# Patient Record
Sex: MALE | Race: WHITE | Employment: OTHER | URBAN - METROPOLITAN AREA
[De-identification: names, ages, dates, MRNs, and addresses within clinical notes are randomized per-mention and may not be internally consistent; named-entity substitution may affect disease eponyms.]

---

## 2017-04-26 RX ORDER — METOPROLOL SUCCINATE 50 MG/1
TABLET, EXTENDED RELEASE ORAL
Qty: 90 TAB | Refills: 3 | Status: SHIPPED | OUTPATIENT
Start: 2017-04-26 | End: 2018-05-07 | Stop reason: SDUPTHER

## 2017-10-23 ENCOUNTER — OFFICE VISIT (OUTPATIENT)
Dept: CARDIOLOGY CLINIC | Age: 72
End: 2017-10-23

## 2017-10-23 VITALS
SYSTOLIC BLOOD PRESSURE: 170 MMHG | HEART RATE: 56 BPM | DIASTOLIC BLOOD PRESSURE: 95 MMHG | HEIGHT: 70 IN | OXYGEN SATURATION: 98 % | WEIGHT: 161 LBS | BODY MASS INDEX: 23.05 KG/M2

## 2017-10-23 DIAGNOSIS — I10 ESSENTIAL HYPERTENSION: ICD-10-CM

## 2017-10-23 DIAGNOSIS — I44.7 LBBB (LEFT BUNDLE BRANCH BLOCK): Primary | ICD-10-CM

## 2017-10-23 NOTE — PROGRESS NOTES
1. Have you been to the ER, urgent care clinic since your last visit? Hospitalized since your last visit?no    2. Have you seen or consulted any other health care providers outside of the [address] [place] [address] since your last visit? Include any pap smears or colon screening.  no

## 2017-10-23 NOTE — MR AVS SNAPSHOT
Visit Information Date & Time Provider Department Dept. Phone Encounter #  
 10/23/2017  8:00 AM Brittney Bedoya DO [facility] [id number] Upcoming Health Maintenance Date Due DTaP/Tdap/Td series (1 - Tdap) 8/14/1964 FOBT Q 1 YEAR AGE 50-75 8/14/1993 ZOSTER VACCINE AGE 60> 6/14/2003 GLAUCOMA SCREENING Q2Y 8/14/2008 Pneumococcal 65+ Low/Medium Risk (1 of 2 - PCV13) 8/14/2008 MEDICARE YEARLY EXAM 8/14/2008 INFLUENZA AGE 9 TO ADULT 8/1/2017 Allergies as of 10/23/2017  Review Complete On: 10/23/2017 By: Brittney Bedoya DO No Known Allergies Current Immunizations  Never Reviewed No immunizations on file. Not reviewed this visit You Were Diagnosed With   
  
 Codes Comments LBBB (left bundle branch block)    -  Primary ICD-10-CM: I44.7 ICD-9-CM: 426.3 Essential hypertension     ICD-10-CM: I10 
ICD-9-CM: 401.9 Vitals BP Pulse Height(growth percentile) Weight(growth percentile) SpO2 BMI  
 (!) 170/95 (BP 1 Location: Left arm, BP Patient Position: Sitting) (!) 56 5' 10\" (1.778 m) 161 lb (73 kg) 98% 23.1 kg/m2 Smoking Status Former Smoker Vitals History BMI and BSA Data Body Mass Index Body Surface Area  
 23.1 kg/m 2 1.9 m 2 Preferred Pharmacy Pharmacy Name Phone [facility] [address] [place] [address] [phone] Your Updated Medication List  
  
   
This list is accurate as of: 10/23/17  8:36 AM.  Always use your most recent med list.  
  
  
  
  
 aspirin delayed-release 81 mg tablet Take 81 mg by mouth daily. calcium carbonate 200 mg calcium (500 mg) Chew Commonly known as:  TUMS Take 1 tablet by mouth as needed. FISH OIL 1,000 mg Cap Generic drug:  omega-3 fatty acids-vitamin e Take 1 Cap by mouth daily. metoprolol succinate 50 mg XL tablet Commonly known as:  TOPROL-XL  
TAKE 1 TABLET BY MOUTH EVERY DAY  
  
 nitroglycerin 0.4 mg SL tablet Commonly known as:  NITROSTAT  
1 Tab by SubLINGual route every five (5) minutes as needed for Chest Pain. We Performed the Following AMB POC EKG ROUTINE W/ 12 LEADS, INTER & REP [10580 CPT(R)] Introducing [facility] SERVICES! [employer] introduces Topic patient portal. Now you can access parts of your medical record, email your doctor's office, and request medication refills online. 1. In your internet browser, go to [email or web address] 2. Click on the First Time User? Click Here link in the Sign In box. You will see the New Member Sign Up page. 3. Enter your Topic Access Code exactly as it appears below. You will not need to use this code after youve completed the sign-up process. If you do not sign up before the expiration date, you must request a new code. · Topic Access Code: [email or web address] Expires: 1/21/2018  7:44 AM 
 
4. Enter the last four digits of your Social Security Number (xxxx) and Date of Birth (mm/dd/yyyy) as indicated and click Submit. You will be taken to the next sign-up page. 5. Create a Topic ID. This will be your Topic login ID and cannot be changed, so think of one that is secure and easy to remember. 6. Create a Topic password. You can change your password at any time. 7. Enter your Password Reset Question and Answer. This can be used at a later time if you forget your password. 8. Enter your e-mail address. You will receive e-mail notification when new information is available in [address] 9. Click Sign Up. You can now view and download portions of your medical record. 10. Click the Download Summary menu link to download a portable copy of your medical information. If you have questions, please visit the Frequently Asked Questions section of the Topic website.  Remember, Topic is NOT to be used for urgent needs. For medical emergencies, dial 911. Now available from your iPhone and Android! Please provide this summary of care documentation to your next provider. Your primary care clinician is listed as Mary Wharton. If you have any questions after today's visit, please call [phone].

## 2017-10-23 NOTE — PROGRESS NOTES
HPI: I saw Dannie Mulligan III in my office today a cardiovascular evaluation regarding his conduction system disease. Mr. Yamile Vaughan is a pleasant 75-year-old white male with history of a chronic left bundle branch block and some atypical anginal type chest discomforts over the years which actually have been more suggestive of gastroesophageal reflux disease than angina.     He does have history of having a nuclear myocardial perfusion study in December 2013 which showed no evidence of prior infarction or ischemia and an overall ejection fraction of 48% with some septal and apical dyskinesia likely related to his left bundle branch block.     He comes to the office today and relates that he is really doing quite well. He just got back from a three-week trip to [place] and did a lot of walking over there and had absolutely no problems with chest pain, shortness of breath, or fatigue issues. Encounter Diagnoses   Name Primary?  LBBB (left bundle branch block) Yes    Essential hypertension with suboptimal control        Discussion: This gentleman appears to be doing about as well as we could expect clinically without any cardiovascular symptomatology. However, his blood pressure was elevated today and although he did not take his Toprol this morning I suspect that his blood pressure is high enough that it may warrant another blood pressure medication. I have asked him to check his blood pressure 2 or 3 times a week for the next couple of weeks after he takes his morning Toprol and if his blood pressure is remaining primarily above 541 systolic he should follow-up with Dr. Margaret Leon for adjustment of his blood pressure medications. I would suspect that he might benefit from a thiazide diuretic or amlodipine, but course I would leave decision on that up to Dr. Margaret Leon.     He does have a chronic left bundle branch block and we did discuss the fact that if he should start developing higher degree AV block associated with the right bundle branch he could have dizziness episodes or profound fatigue and if he develops any of these symptoms he will let us know and I would get a Holter monitor study on him. Historically, he has not had only very atypical anginal type chest discomforts in the past and has had none recently and he is never had significant elevation of his cholesterol. Since clinically he otherwise seems to be doing very well, I am simply going to plan to see him again in a year or as needed if any new cardiovascular symptoms should surface in the interim. PCP: Avril Gaspar MD      Past Medical History:   Diagnosis Date    BPH (benign prostatic hyperplasia)     minimal    Cardiac nuclear imaging test 12/23/2013    No ischemia or prior infarction. EF 48% (possibly underestimated). Septal & apical dysk likely from LBBB. Nondiagnostic EKG on pharm stress test due to LBBB.  Family history of prostate cancer     LBBB (left bundle branch block)     Screening for prostate cancer        Past Surgical History:   Procedure Laterality Date    HX HERNIA REPAIR  5/1970    HX TONSILLECTOMY         Current Outpatient Rx   Name  Route  Sig  Dispense  Refill    metoprolol succinate (TOPROL-XL) 50 mg XL tablet        TAKE 1 TABLET BY MOUTH EVERY DAY    90 tablet    3      calcium carbonate (TUMS) 200 mg calcium (500 mg) chew    Oral    Take 1 tablet by mouth as needed.  aspirin delayed-release 81 mg tablet    Oral    Take 81 mg by mouth daily.  omega-3 fatty acids-vitamin e (FISH OIL) 1,000 mg cap    Oral    Take 1 Cap by mouth daily.  nitroglycerin (NITROSTAT) 0.4 mg SL tablet    SubLINGual    1 Tab by SubLINGual route every five (5) minutes as needed for Chest Pain.     25 Tab    3         No Known Allergies    Social History :  Social History   Substance Use Topics    Smoking status: Former Smoker     Packs/day: 1.00     Years: 5.00     Quit date: 1/1/1973    Smokeless tobacco: Never Used    Alcohol use Yes      Comment: socially        Family History: family history includes Cancer in an other family member; Heart defect in his father. Review of Systems:    Constitutional: Negative. Respiratory: Negative. Cardiovascular: Negative. Gastrointestinal: Positive for heartburn. Negative for abdominal pain, blood in stool, constipation, diarrhea, melena, nausea and vomiting. Musculoskeletal: Negative. Physical Exam:    The patient is a cooperative, alert, well developed, well nourished 74 y.o.  male who is in no acute distress at the time of the examination. Visit Vitals    BP (!) 170/95 (BP 1 Location: Left arm, BP Patient Position: Sitting)    Pulse (!) 56    Ht 5' 10\" (1.778 m)    Wt 73 kg (161 lb)    SpO2 98%    BMI 23.1 kg/m2       HEENT: Conjuctiva white, mucosa moist, no pallor or cyanosis. NECK: Supple without masses, tenderness or thyromegaly. There was no jugular venous distention. Carotid are full bilaterally without bruits. CHEST: Symmetrical with good excursion. LUNGS: Clear to auscultation in all fields. HEART: The apex is not displaced. There were no lifts, thrills or heaves. There is a normal S1 and S2 without appreciable murmurs, rubs, clicks, or gallops auscultated. ABDOMEN: Soft without masses, tenderness or organomegaly. EXTREMITIES: Full peripheral pulses without peripheral edema. Review of Data: See PMH and Cardiology and Imaging sections for cardiac testing    Lab Results   Component Value Date/Time    Cholesterol, total 135 12/18/2013 12:00 AM    HDL Cholesterol 40 12/18/2013 12:00 AM    LDL, calculated 75 12/18/2013 12:00 AM    VLDL, calculated 20 12/18/2013 12:00 AM    Triglyceride 101 12/18/2013 12:00 AM       Results for orders placed or performed in visit on 10/23/17   AMB POC EKG ROUTINE W/ 12 LEADS, INTER & REP     Status: None    Narrative    Sinus bradycardia rate 56.   Left axis deviation. Complete left bundle branch block with some secondary ST-T changes. This EKG is similar to the EKG of October 24, 2016. Brittney Bedoya D.O., F.A.C.C. [facility]  [address] [place] [address] and [facility] [place]  [address] [place] [address]. [address]    PLEASE NOTE:  This document has been produced using voice recognition software. Unrecognized errors in transcription may be present.

## 2017-10-23 NOTE — PROGRESS NOTES
Review of Systems   Constitutional: Negative. Respiratory: Negative. Cardiovascular: Negative. Gastrointestinal: Positive for heartburn. Negative for abdominal pain, blood in stool, constipation, diarrhea, melena, nausea and vomiting. Musculoskeletal: Negative.

## 2018-05-07 RX ORDER — METOPROLOL SUCCINATE 50 MG/1
TABLET, EXTENDED RELEASE ORAL
Qty: 90 TAB | Refills: 3 | Status: SHIPPED | OUTPATIENT
Start: 2018-05-07 | End: 2019-05-01 | Stop reason: SDUPTHER

## 2018-10-29 ENCOUNTER — OFFICE VISIT (OUTPATIENT)
Dept: CARDIOLOGY CLINIC | Age: 73
End: 2018-10-29

## 2018-10-29 VITALS
WEIGHT: 158 LBS | OXYGEN SATURATION: 98 % | DIASTOLIC BLOOD PRESSURE: 84 MMHG | BODY MASS INDEX: 22.62 KG/M2 | HEART RATE: 54 BPM | SYSTOLIC BLOOD PRESSURE: 142 MMHG | HEIGHT: 70 IN

## 2018-10-29 DIAGNOSIS — I10 ESSENTIAL HYPERTENSION: ICD-10-CM

## 2018-10-29 DIAGNOSIS — I44.7 LBBB (LEFT BUNDLE BRANCH BLOCK): Primary | ICD-10-CM

## 2018-10-29 RX ORDER — RAMIPRIL 10 MG/1
CAPSULE ORAL
Refills: 0 | COMMUNITY
Start: 2018-07-30

## 2018-10-29 NOTE — PROGRESS NOTES
[facility] for Chief Complaint Patient presents with  Abnormal EKG  
  1 year follow up [facility] preferred language for health care discussion is english/other. Is someone accompanying this pt? no 
 
Is the patient using any DME equipment during OV? no 
 
Depression Screening: PHQ over the last two weeks 10/29/2018 Little interest or pleasure in doing things Not at all Feeling down, depressed, irritable, or hopeless Not at all Total Score PHQ 2 0 Learning Assessment: 
Learning Assessment 10/29/2018 PRIMARY LEARNER Patient HIGHEST LEVEL OF EDUCATION - PRIMARY LEARNER  GRADUATED HIGH SCHOOL OR GED  
BARRIERS PRIMARY LEARNER NONE  
CO-LEARNER CAREGIVER No  
PRIMARY LANGUAGE ENGLISH  
LEARNER PREFERENCE PRIMARY READING  
  -  
  -  
ANSWERED BY Patient RELATIONSHIP SELF Abuse Screening: 
Abuse Screening Questionnaire 10/29/2018 Do you ever feel afraid of your partner? Rella Picket Are you in a relationship with someone who physically or mentally threatens you? Rella Picket Is it safe for you to go home? Srini Bloom Fall Risk Fall Risk Assessment, last 12 mths 10/29/2018 Able to walk? Yes Fall in past 12 months? No  
 
 
Pt currently taking Anticoagulant therapy? no 
 
Coordination of Care: 1. Have you been to the ER, urgent care clinic since your last visit? Hospitalized since your last visit? no 
 
2. Have you seen or consulted any other health care providers outside of the [address] [place] [address] since your last visit? Include any pap smears or colon screening. no

## 2018-10-29 NOTE — PROGRESS NOTES
HPI:  I saw Deb Granado III in my office today a cardiovascular evaluation regarding his conduction system disease. Mr. Michael Macedo is a pleasant 68-year-old white male with history of a chronic left bundle branch block and some atypical anginal type chest discomforts over the years which actually have been more suggestive of gastroesophageal reflux disease than angina. 
  
He does have history of having a nuclear myocardial perfusion study in December 2013 which showed no evidence of prior infarction or ischemia and an overall ejection fraction of 48% with some septal and apical dyskinesia likely related to his left bundle branch block. He comes in today and relates that he is doing reasonably well. He is staying fairly active and denies any cardiovascular symptomatology at this time. Encounter Diagnoses Name Primary?  LBBB (left bundle branch block) Yes  Essential hypertension Discussion: This gentleman continues to do quite well and I have no recommendations for change at this time. He is not having any problems with chest pain, shortness of breath, or fatigue or any other cardiovascular complaints to suggest any problems with the development of obstructive coronary artery disease. He does not have history of hypercholesterolemia but I would like to get a copy of his latest lipid profile from his family physician. His blood pressure is borderline elevated today at 142/84 but he was my first patient and he just taken his medications within an hour or so of his visit I am not going to make any changes and would simply have him follow-up with his family physician in that regard. He does have a left bundle branch block but is not had any problems with dizziness or lightheadedness issues and since he is otherwise doing well I will simply plan to see him again in a year. PCP: Divya Nieto MD 
 
 
Past Medical History:  
Diagnosis Date  BPH (benign prostatic hyperplasia)   
 minimal  
 Cardiac nuclear imaging test 2013 No ischemia or prior infarction. EF 48% (possibly underestimated). Septal & apical dysk likely from LBBB. Nondiagnostic EKG on pharm stress test due to LBBB.  Family history of prostate cancer  LBBB (left bundle branch block)  Screening for prostate cancer Past Surgical History:  
Procedure Laterality Date  HX HERNIA REPAIR  1970  HX TONSILLECTOMY Current Outpatient Medications Medication Sig  
 ramipril (ALTACE) 10 mg capsule TK 1 C PO D FOR BP  
 metoprolol succinate (TOPROL-XL) 50 mg XL tablet TAKE 1 TABLET BY MOUTH EVERY DAY  calcium carbonate (TUMS) 200 mg calcium (500 mg) chew Take 1 tablet by mouth as needed.  aspirin delayed-release 81 mg tablet Take 81 mg by mouth daily.  omega-3 fatty acids-vitamin e (FISH OIL) 1,000 mg cap Take 1 Cap by mouth daily.  nitroglycerin (NITROSTAT) 0.4 mg SL tablet 1 Tab by SubLINGual route every five (5) minutes as needed for Chest Pain. No current facility-administered medications for this visit. No Known Allergies Social History : 
Social History Tobacco Use  Smoking status: Former Smoker Packs/day: 1.00 Years: 5.00 Pack years: 5.00 Last attempt to quit: 1973 Years since quittin.8  Smokeless tobacco: Never Used Substance Use Topics  Alcohol use: Yes Comment: socially Family History: family history includes Cancer in an other family member; Heart defect in his father. Review of Systems:   
Constitutional: Negative. Respiratory: Negative. Cardiovascular: Negative. Gastrointestinal: Negative. Musculoskeletal: Negative. Neurological: Negative for dizziness. Physical Exam:   
The patient is a cooperative, alert, well developed, well nourished 74 y.o.  male who is in no acute distress at the time of the examination. Visit Vitals /84 Pulse (!) 54 Ht 5' 10\" (1.778 m) Wt 71.7 kg (158 lb) SpO2 98% BMI 22.67 kg/m² HEENT: Conjuctiva white, mucosa moist, no pallor or cyanosis. NECK: Supple without masses, tenderness or thyromegaly. There was no jugular venous distention. Carotid are full bilaterally without bruits. CHEST: Symmetrical with good excursion. LUNGS: Clear to auscultation in all fields. HEART: The apex is not displaced. There were no lifts, thrills or heaves. There is a normal S1 and S2 without appreciable murmurs, rubs, clicks, or gallops auscultated. ABDOMEN: Soft without masses, tenderness or organomegaly. EXTREMITIES: Full peripheral pulses without peripheral edema. Review of Data: See PMH and Cardiology and Imaging sections for cardiac testing Lab Results Component Value Date/Time Cholesterol, total 135 12/18/2013 12:00 AM  
 HDL Cholesterol 40 12/18/2013 12:00 AM  
 LDL, calculated 75 12/18/2013 12:00 AM  
 VLDL, calculated 20 12/18/2013 12:00 AM  
 Triglyceride 101 12/18/2013 12:00 AM  
 
 
Results for orders placed or performed in visit on 10/29/18 AMB POC EKG ROUTINE W/ 12 LEADS, INTER & REP     Status: None Narrative Normal sinus rhythm with rate 54. Complete left bundle branch block with PVC and a fusion beat. Compared to the EKG of October 23, 2017 the ectopy is new. Keily Nielson D.O., F.A.C.C. [facility] [place] [id number] [id number] [place] [address] [phone] PLEASE NOTE:  This document has been produced using voice recognition software. Unrecognized errors in transcription may be present.

## 2018-12-03 ENCOUNTER — HOSPITAL ENCOUNTER (OUTPATIENT)
Dept: BONE DENSITY | Age: 73
Discharge: HOME | End: 2018-12-03
Attending: FAMILY MEDICINE
Payer: MEDICARE

## 2018-12-03 ENCOUNTER — HOSPITAL ENCOUNTER (OUTPATIENT)
Dept: ULTRASOUND IMAGING | Age: 73
Discharge: HOME | End: 2018-12-03
Attending: FAMILY MEDICINE
Payer: MEDICARE

## 2018-12-03 DIAGNOSIS — Z13.6 SCREENING FOR AAA (AORTIC ABDOMINAL ANEURYSM): ICD-10-CM

## 2018-12-03 DIAGNOSIS — M85.88 OTHER SPECIFIED DISORDERS OF BONE DENSITY AND STRUCTURE, OTHER SITE: ICD-10-CM

## 2018-12-03 PROCEDURE — 77080 DXA BONE DENSITY AXIAL: CPT

## 2018-12-03 PROCEDURE — 76706 US ABDL AORTA SCREEN AAA: CPT

## 2019-05-08 RX ORDER — METOPROLOL SUCCINATE 50 MG/1
TABLET, EXTENDED RELEASE ORAL
Qty: 90 TAB | Refills: 3 | Status: SHIPPED | OUTPATIENT
Start: 2019-05-08 | End: 2020-04-29 | Stop reason: SDUPTHER

## 2019-10-28 ENCOUNTER — OFFICE VISIT (OUTPATIENT)
Dept: CARDIOLOGY CLINIC | Age: 74
End: 2019-10-28

## 2019-10-28 VITALS
OXYGEN SATURATION: 96 % | SYSTOLIC BLOOD PRESSURE: 136 MMHG | DIASTOLIC BLOOD PRESSURE: 82 MMHG | HEART RATE: 56 BPM | HEIGHT: 70 IN | BODY MASS INDEX: 22.19 KG/M2 | WEIGHT: 155 LBS

## 2019-10-28 DIAGNOSIS — I20.8 ATYPICAL ANGINA (HCC): ICD-10-CM

## 2019-10-28 DIAGNOSIS — I44.7 LBBB (LEFT BUNDLE BRANCH BLOCK): Primary | ICD-10-CM

## 2019-10-28 DIAGNOSIS — I10 ESSENTIAL HYPERTENSION: ICD-10-CM

## 2019-10-28 NOTE — PROGRESS NOTES
HPI:  I saw Joana Oneal III in my office today a cardiovascular evaluation regarding his conduction system disease. Mr. rPema Madden is a pleasant 42-year-old white male with history of a chronic left bundle branch block and some atypical anginal type chest discomforts over the years which actually have been more suggestive of gastroesophageal reflux disease than angina.     He does have history of having a nuclear myocardial perfusion study in December 2013 which showed no evidence of prior infarction or ischemia and an overall ejection fraction of 48% with some septal and apical dyskinesia likely related to his left bundle branch block.     He comes in today and relates he is doing reasonably well. He is remaining quite active and denies any cardiovascular symptomatology at this time. Specifically, he is having no problems with dizziness or weakness. Encounter Diagnoses   Name Primary?  LBBB (left bundle branch block) Yes    Essential hypertension      Atypical angina ([address] none recently        Discussion: This gentleman appears to be doing quite well at this juncture and I have no recommendations for change currently he is not having any symptoms to suggest either development of symptomatic obstructive coronary disease or symptomatic conduction system disease. I do not have a copy of his latest lipid profile, but he tells me his cholesterol has been doing quite well on no statin therapy. We did discuss the fact that he may want to consider doing a coronary calcium score if his cholesterol is at all elevated since if that score was 0 we would not consider statin therapy whereas if it was significantly elevated like 400 or more and his risk of having a heart attack was 25% or more the next 10 years he may want to consider a more aggressive approach to lowering his cholesterol with statin therapy and/or a PCSK9 inhibitor if necessary.     His blood pressure is well controlled today and he otherwise seems to be doing well so I am simply get a plan to see him again in a year. PCP: Adilene Lacy MD      Past Medical History:   Diagnosis Date    BPH (benign prostatic hyperplasia)     minimal    Cardiac nuclear imaging test 2013    No ischemia or prior infarction. EF 48% (possibly underestimated). Septal & apical dysk likely from LBBB. Nondiagnostic EKG on pharm stress test due to LBBB.  Family history of prostate cancer     LBBB (left bundle branch block)     Screening for prostate cancer        Past Surgical History:   Procedure Laterality Date    HX HERNIA REPAIR  1970    HX TONSILLECTOMY       Current Outpatient Medications   Medication Sig    metoprolol succinate (TOPROL-XL) 50 mg XL tablet TAKE 1 TABLET BY MOUTH DAILY    ramipril (ALTACE) 10 mg capsule TK 1 C PO D FOR BP    calcium carbonate (TUMS) 200 mg calcium (500 mg) chew Take 1 tablet by mouth as needed.  aspirin delayed-release 81 mg tablet Take 81 mg by mouth daily.  omega-3 fatty acids-vitamin e (FISH OIL) 1,000 mg cap Take 1 Cap by mouth daily.  nitroglycerin (NITROSTAT) 0.4 mg SL tablet 1 Tab by SubLINGual route every five (5) minutes as needed for Chest Pain. No current facility-administered medications for this visit. No Known Allergies    Social History :  Social History     Tobacco Use    Smoking status: Former Smoker     Packs/day: 1.00     Years: 5.00     Pack years: 5.00     Last attempt to quit: 1973     Years since quittin.8    Smokeless tobacco: Never Used   Substance Use Topics    Alcohol use: Yes     Comment: socially        Family History: family history includes Cancer in an other family member; Heart defect in his father. Review of Systems:    Constitutional: Negative. Respiratory: Negative. Cardiovascular: Negative. Gastrointestinal: Negative. Musculoskeletal: Negative. Neurological: Negative for dizziness.      Physical Exam:    The patient is a cooperative, alert, well developed, well nourished 66 y.o.  male who is in no acute distress at the time of the examination. Visit Vitals  /82   Pulse (!) 56   Ht 5' 10\" (1.778 m)   Wt 70.3 kg (155 lb)   SpO2 96%   BMI 22.24 kg/m²       HEENT: Conjuctiva white, mucosa moist, no pallor or cyanosis. NECK: Supple without masses, tenderness or thyromegaly. There was no jugular venous distention. Carotid are full bilaterally without bruits. CHEST: Symmetrical with good excursion. LUNGS: Clear to auscultation in all fields. HEART: The apex is not displaced. There were no lifts, thrills or heaves. There is a normal S1 and S2 without appreciable murmurs, rubs, clicks, or gallops auscultated. ABDOMEN: Soft without masses, tenderness or organomegaly. EXTREMITIES: Full peripheral pulses without peripheral edema. Review of Data: See PMH and Cardiology and Imaging sections for cardiac testing    Lab Results   Component Value Date/Time    Cholesterol, total 135 12/18/2013 12:00 AM    HDL Cholesterol 40 12/18/2013 12:00 AM    LDL, calculated 75 12/18/2013 12:00 AM    VLDL, calculated 20 12/18/2013 12:00 AM    Triglyceride 101 12/18/2013 12:00 AM       Results for orders placed or performed in visit on 10/28/19   AMB POC EKG ROUTINE W/ 12 LEADS, INTER & REP     Status: None    Narrative    Sinus bradycardia rate 56 with complete left bundle branch block and some secondary ST-T changes. Compared to to the EKG of October 29, 2018 there was little interval change. Cherie White D.O., F.A.C.C. [facility]  [facility] [place]  [address]    PLEASE NOTE:  This document has been produced using voice recognition software. Unrecognized errors in transcription may be present.

## 2019-10-28 NOTE — PROGRESS NOTES
Zelalem Lopez presents today for No chief complaint on file. Isidro Moss III preferred language for health care discussion is english/other. Is someone accompanying this pt?no    Is the patient using any DME equipment during OV? no    Depression Screening:  3 most recent PHQ Screens 10/29/2018   Little interest or pleasure in doing things Not at all   Feeling down, depressed, irritable, or hopeless Not at all   Total Score PHQ 2 0       Learning Assessment:  Learning Assessment 10/29/2018   PRIMARY LEARNER Patient   HIGHEST LEVEL OF EDUCATION - PRIMARY LEARNER  GRADUATED HIGH SCHOOL OR GED   BARRIERS PRIMARY LEARNER NONE   CO-LEARNER CAREGIVER No   PRIMARY LANGUAGE ENGLISH   LEARNER PREFERENCE PRIMARY READING     -     -   ANSWERED BY Patient   RELATIONSHIP SELF       Abuse Screening:  Abuse Screening Questionnaire 10/29/2018   Do you ever feel afraid of your partner? N   Are you in a relationship with someone who physically or mentally threatens you? N   Is it safe for you to go home? Y       Fall Risk  Fall Risk Assessment, last 12 mths 10/29/2018   Able to walk? Yes   Fall in past 12 months? No       Pt currently taking Anticoagulant therapy? no    Coordination of Care:  1. Have you been to the ER, urgent care clinic since your last visit? Hospitalized since your last visit? no    2. Have you seen or consulted any other health care providers outside of the [address] [place] [address] since your last visit? Include any pap smears or colon screening.  no

## 2020-02-04 DIAGNOSIS — I20.8 ATYPICAL ANGINA (HCC): Primary | ICD-10-CM

## 2020-02-12 ENCOUNTER — HOSPITAL ENCOUNTER (OUTPATIENT)
Dept: CT IMAGING | Age: 75
Discharge: HOME | End: 2020-02-12
Attending: INTERNAL MEDICINE
Payer: SELF-PAY

## 2020-02-12 DIAGNOSIS — I20.8 ATYPICAL ANGINA (HCC): ICD-10-CM

## 2020-02-12 PROCEDURE — 75571 CT HRT W/O DYE W/CA TEST: CPT

## 2020-02-14 NOTE — PROGRESS NOTES
Per last note 10/2019 \"I do not have a copy of his latest lipid profile, but he tells me his cholesterol has been doing quite well on no statin therapy. We did discuss the fact that he may want to consider doing a coronary calcium score if his cholesterol is at all elevated since if that score was 0 we would not consider statin therapy whereas if it was significantly elevated like 400 or more and his risk of having a heart attack was 25% or more the next 10 years he may want to consider a more aggressive approach to lowering his cholesterol with statin therapy and/or a PCSK9 inhibitor if necessary. \"

## 2020-02-17 ENCOUNTER — TELEPHONE (OUTPATIENT)
Dept: OTHER | Age: 75
End: 2020-02-17

## 2020-02-17 NOTE — TELEPHONE ENCOUNTER
Patient identity verified and matched to demographic data. Patient notified of Coronary Calcium Score of 0. Patient education provided to maintain healthy lifestyle, healthy diet, and regular exercise. Patient verbalized understanding of results, patient education, and follow up care.

## 2020-02-21 ENCOUNTER — TELEPHONE (OUTPATIENT)
Dept: CARDIOLOGY CLINIC | Age: 75
End: 2020-02-21

## 2020-02-21 NOTE — TELEPHONE ENCOUNTER
I called and talked to the patient about his coronary calcium score which was zero suggesting very low risk of myocardial infarction in the next 10 years.  ES

## 2020-02-21 NOTE — TELEPHONE ENCOUNTER
----- Message from Angelina Gore RN sent at 2/14/2020  8:07 AM EST ----- Per last note 10/2019 \"I do not have a copy of his latest lipid profile, but he tells me his cholesterol has been doing quite well on no statin therapy. We did discuss the fact that he may want to consider doing a coronary calcium score if his cholesterol is at all elevated since if that score was 0 we would not consider statin therapy whereas if it was significantly elevated like 400 or more and his risk of having a heart attack was 25% or more the next 10 years he may want to consider a more aggressive approach to lowering his cholesterol with statin therapy and/or a PCSK9 inhibitor if necessary. \"

## 2020-02-21 NOTE — TELEPHONE ENCOUNTER
----- Message from Misbah Reddy RN sent at 2/14/2020  8:07 AM EST ----- Per last note 10/2019 \"I do not have a copy of his latest lipid profile, but he tells me his cholesterol has been doing quite well on no statin therapy. We did discuss the fact that he may want to consider doing a coronary calcium score if his cholesterol is at all elevated since if that score was 0 we would not consider statin therapy whereas if it was significantly elevated like 400 or more and his risk of having a heart attack was 25% or more the next 10 years he may want to consider a more aggressive approach to lowering his cholesterol with statin therapy and/or a PCSK9 inhibitor if necessary. \"

## 2020-04-29 RX ORDER — METOPROLOL SUCCINATE 50 MG/1
TABLET, EXTENDED RELEASE ORAL
Qty: 90 TAB | Refills: 3 | Status: SHIPPED | OUTPATIENT
Start: 2020-04-29 | End: 2021-05-03

## 2020-11-09 ENCOUNTER — OFFICE VISIT (OUTPATIENT)
Dept: CARDIOLOGY CLINIC | Age: 75
End: 2020-11-09
Payer: MEDICARE

## 2020-11-09 VITALS
HEIGHT: 70 IN | SYSTOLIC BLOOD PRESSURE: 120 MMHG | BODY MASS INDEX: 22.33 KG/M2 | HEART RATE: 68 BPM | WEIGHT: 156 LBS | OXYGEN SATURATION: 98 % | DIASTOLIC BLOOD PRESSURE: 70 MMHG

## 2020-11-09 DIAGNOSIS — I20.8 ATYPICAL ANGINA (HCC): ICD-10-CM

## 2020-11-09 DIAGNOSIS — I10 ESSENTIAL HYPERTENSION: ICD-10-CM

## 2020-11-09 DIAGNOSIS — I44.7 LBBB (LEFT BUNDLE BRANCH BLOCK): Primary | ICD-10-CM

## 2020-11-09 PROCEDURE — 99214 OFFICE O/P EST MOD 30 MIN: CPT | Performed by: INTERNAL MEDICINE

## 2020-11-09 PROCEDURE — G8427 DOCREV CUR MEDS BY ELIG CLIN: HCPCS | Performed by: INTERNAL MEDICINE

## 2020-11-09 PROCEDURE — 93000 ELECTROCARDIOGRAM COMPLETE: CPT | Performed by: INTERNAL MEDICINE

## 2020-11-09 PROCEDURE — G8536 NO DOC ELDER MAL SCRN: HCPCS | Performed by: INTERNAL MEDICINE

## 2020-11-09 PROCEDURE — G8510 SCR DEP NEG, NO PLAN REQD: HCPCS | Performed by: INTERNAL MEDICINE

## 2020-11-09 PROCEDURE — G8420 CALC BMI NORM PARAMETERS: HCPCS | Performed by: INTERNAL MEDICINE

## 2020-11-09 NOTE — PROGRESS NOTES
Review of Systems Constitutional: Negative. Respiratory: Negative. Cardiovascular: Negative. Gastrointestinal: Negative. Musculoskeletal: Negative. Neurological: Negative for dizziness.

## 2020-11-09 NOTE — PROGRESS NOTES
Bertram Dhillon III presents today for   Chief Complaint   Patient presents with    Follow-up     1 year follow up - no cardiac complaints        Bertram Alejo LAWTON preferred language for health care discussion is english/other. Is someone accompanying this pt? no    Is the patient using any DME equipment during OV? no    Depression Screening:  3 most recent PHQ Screens 11/9/2020   Little interest or pleasure in doing things Not at all   Feeling down, depressed, irritable, or hopeless Not at all   Total Score PHQ 2 0       Learning Assessment:  Learning Assessment 10/29/2018   PRIMARY LEARNER Patient   HIGHEST LEVEL OF EDUCATION - PRIMARY LEARNER  GRADUATED HIGH SCHOOL OR GED   BARRIERS PRIMARY LEARNER NONE   CO-LEARNER CAREGIVER No   PRIMARY LANGUAGE ENGLISH   LEARNER PREFERENCE PRIMARY READING     -     -   ANSWERED BY Patient   RELATIONSHIP SELF       Abuse Screening:  Abuse Screening Questionnaire 10/29/2018   Do you ever feel afraid of your partner? N   Are you in a relationship with someone who physically or mentally threatens you? N   Is it safe for you to go home? Y       Fall Risk  Fall Risk Assessment, last 12 mths 11/9/2020   Able to walk? Yes   Fall in past 12 months? No       Pt currently taking Anticoagulant therapy? ASA 81mg every day     Coordination of Care:  1. Have you been to the ER, urgent care clinic since your last visit? Hospitalized since your last visit? no    2. Have you seen or consulted any other health care providers outside of the [address] [place] [address] since your last visit? Include any pap smears or colon screening.  no

## 2020-11-09 NOTE — PROGRESS NOTES
HPI:  I saw Billie Field III in my office today a cardiovascular evaluation regarding his conduction system disease. Mr. Ulisses Scales is a pleasant 78-year-old white male with history of a chronic left bundle branch block and some atypical anginal type chest discomforts over the years which actually have been more suggestive of gastroesophageal reflux disease than angina.     He does have history of having a nuclear myocardial perfusion study in December 2013 which showed no evidence of prior infarction or ischemia and an overall ejection fraction of 48% with some septal and apical dyskinesia likely related to his left bundle branch block. He comes in today and relates that he is doing quite well. He is remaining quite active for his age and denies any cardiovascular symptomatology at this time. Encounter Diagnoses   Name Primary?  Atypical angina ([address] none recently     LBBB (left bundle branch block) Yes    Essential hypertension         Discussion: This patient appears to be doing better as well as we could expect and really have no recommendations for change at this time. He is not having symptoms to suggest the development of symptomatic obstructive coronary artery disease or signs of symptomatic conduction system disease. Historically, he has not had hypercholesterolemia and his blood pressure today is well controlled with his EKG continuing to show a complete left bundle branch block without any other electrocardiographic abnormalities suggest worsening conduction system disease and no dizziness issues. Since he is otherwise doing well we will continue to follow him on an annual basis. In view of the fact that I am going to be retiring in the near future I an going to have him see my associate Dr. Pauline La next year.     PCP: Henry Larios MD      Past Medical History:   Diagnosis Date    BPH (benign prostatic hyperplasia)     minimal    Cardiac nuclear imaging test 12/23/2013 No ischemia or prior infarction. EF 48% (possibly underestimated). Septal & apical dysk likely from LBBB. Nondiagnostic EKG on pharm stress test due to LBBB.  Family history of prostate cancer     LBBB (left bundle branch block)     Screening for prostate cancer        Past Surgical History:   Procedure Laterality Date    HX HERNIA REPAIR  1970    HX TONSILLECTOMY       Current Outpatient Medications   Medication Sig    metoprolol succinate (TOPROL-XL) 50 mg XL tablet TAKE 1 TABLET BY MOUTH DAILY    ramipril (ALTACE) 10 mg capsule TK 1 C PO D FOR BP    calcium carbonate (TUMS) 200 mg calcium (500 mg) chew Take 1 tablet by mouth as needed.  aspirin delayed-release 81 mg tablet Take 81 mg by mouth daily.  omega-3 fatty acids-vitamin e (FISH OIL) 1,000 mg cap Take 1 Cap by mouth daily.  nitroglycerin (NITROSTAT) 0.4 mg SL tablet 1 Tab by SubLINGual route every five (5) minutes as needed for Chest Pain. No current facility-administered medications for this visit. No Known Allergies    Social History :  Social History     Tobacco Use    Smoking status: Former Smoker     Packs/day: 1.00     Years: 5.00     Pack years: 5.00     Last attempt to quit: 1973     Years since quittin.8    Smokeless tobacco: Never Used   Substance Use Topics    Alcohol use: Yes     Comment: socially        Family History: family history includes Cancer in an other family member; Heart defect in his father. Review of Systems:    Review of Systems   Constitutional: Negative. Respiratory: Negative. Cardiovascular: Negative. Gastrointestinal: Negative. Musculoskeletal: Negative. Neurological: Negative for dizziness. Physical Exam:    The patient is a cooperative, alert, well developed, well nourished 66 y.o.  male who is in no acute distress at the time of the examination.   Visit Vitals  /70 (BP 1 Location: Left arm, BP Patient Position: Sitting)   Pulse 68   Ht 5' 10\" (1.778 m)   Wt 70.8 kg (156 lb)   SpO2 98%   BMI 22.38 kg/m²       HEENT: Conjuctiva white, mucosa moist, no pallor or cyanosis. NECK: Supple without masses, tenderness or thyromegaly. There was no jugular venous distention. Carotid are full bilaterally without bruits. CHEST: Symmetrical with good excursion. LUNGS: Clear to auscultation in all fields. HEART: The apex is not displaced. There were no lifts, thrills or heaves. There is a normal S1 and S2 without appreciable murmurs, rubs, clicks, or gallops auscultated. ABDOMEN: Soft without masses, tenderness or organomegaly. EXTREMITIES: Full peripheral pulses without peripheral edema. Review of Data: See PMH and Cardiology and Imaging sections for cardiac testing    Lab Results   Component Value Date/Time    Cholesterol, total 135 12/18/2013 12:00 AM    HDL Cholesterol 40 12/18/2013 12:00 AM    LDL, calculated 75 12/18/2013 12:00 AM    VLDL, calculated 20 12/18/2013 12:00 AM    Triglyceride 101 12/18/2013 12:00 AM       Results for orders placed or performed in visit on 11/09/20   AMB POC EKG ROUTINE W/ 12 LEADS, INTER & REP     Status: None    Narrative    Normal sinus rhythm, rate 68. Complete left bundle branch block with some secondary ST-T changes. Paired to the EKG of October 28, 2019 there was little interval change. Bang Ambrocio D.O., F.A.C.C. Cardiovascular Specialists  [facility] [place]  [address] [place] [address]. [address] [place]    PLEASE NOTE:  This document has been produced using voice recognition software. Unrecognized errors in transcription may be present.

## 2020-11-12 ENCOUNTER — HOSPITAL ENCOUNTER (OUTPATIENT)
Dept: LAB | Age: 75
Discharge: HOME | End: 2020-11-12
Payer: MEDICARE

## 2020-11-12 PROCEDURE — 88305 TISSUE EXAM BY PATHOLOGIST: CPT

## 2021-05-03 RX ORDER — METOPROLOL SUCCINATE 50 MG/1
TABLET, EXTENDED RELEASE ORAL
Qty: 90 TAB | Refills: 3 | Status: SHIPPED | OUTPATIENT
Start: 2021-05-03 | End: 2021-10-26 | Stop reason: ALTCHOICE

## 2021-10-26 ENCOUNTER — OFFICE VISIT (OUTPATIENT)
Dept: CARDIOLOGY CLINIC | Age: 76
End: 2021-10-26
Payer: MEDICARE

## 2021-10-26 VITALS
OXYGEN SATURATION: 97 % | DIASTOLIC BLOOD PRESSURE: 72 MMHG | SYSTOLIC BLOOD PRESSURE: 136 MMHG | HEART RATE: 59 BPM | WEIGHT: 159 LBS | BODY MASS INDEX: 22.76 KG/M2 | HEIGHT: 70 IN

## 2021-10-26 DIAGNOSIS — I20.8 ATYPICAL ANGINA (HCC): ICD-10-CM

## 2021-10-26 DIAGNOSIS — I44.7 LBBB (LEFT BUNDLE BRANCH BLOCK): Primary | ICD-10-CM

## 2021-10-26 PROCEDURE — 99214 OFFICE O/P EST MOD 30 MIN: CPT | Performed by: INTERNAL MEDICINE

## 2021-10-26 PROCEDURE — 1101F PT FALLS ASSESS-DOCD LE1/YR: CPT | Performed by: INTERNAL MEDICINE

## 2021-10-26 PROCEDURE — G8510 SCR DEP NEG, NO PLAN REQD: HCPCS | Performed by: INTERNAL MEDICINE

## 2021-10-26 PROCEDURE — G8420 CALC BMI NORM PARAMETERS: HCPCS | Performed by: INTERNAL MEDICINE

## 2021-10-26 PROCEDURE — 93000 ELECTROCARDIOGRAM COMPLETE: CPT | Performed by: INTERNAL MEDICINE

## 2021-10-26 PROCEDURE — G8536 NO DOC ELDER MAL SCRN: HCPCS | Performed by: INTERNAL MEDICINE

## 2021-10-26 PROCEDURE — G8427 DOCREV CUR MEDS BY ELIG CLIN: HCPCS | Performed by: INTERNAL MEDICINE

## 2021-10-26 RX ORDER — METOPROLOL TARTRATE 50 MG/1
TABLET ORAL
COMMUNITY
Start: 2021-10-05

## 2021-10-26 NOTE — PROGRESS NOTES
Nitza Ayers III presents today for   Chief Complaint   Patient presents with    Follow-up     1 year  - prev marvin        Nitza Ayers III preferred language for health care discussion is english/other. Is someone accompanying this pt? no    Is the patient using any DME equipment during [address]? no    Depression Screening:  3 most recent PHQ Screens 10/26/2021   Little interest or pleasure in doing things Not at all   Feeling down, depressed, irritable, or hopeless Not at all   Total Score PHQ 2 0       Learning Assessment:  Learning Assessment 10/29/2018   PRIMARY LEARNER Patient   HIGHEST LEVEL OF EDUCATION - PRIMARY LEARNER  GRADUATED HIGH SCHOOL OR GED   BARRIERS PRIMARY LEARNER NONE   CO-LEARNER CAREGIVER No   PRIMARY LANGUAGE ENGLISH   LEARNER PREFERENCE PRIMARY READING     -     -   ANSWERED BY Patient   RELATIONSHIP SELF       Abuse Screening:  Abuse Screening Questionnaire 10/29/2018   Do you ever feel afraid of your partner? N   Are you in a relationship with someone who physically or mentally threatens you? N   Is it safe for you to go home? Y       Fall Risk  Fall Risk Assessment, last 12 mths 10/26/2021   Able to walk? Yes   Fall in past 12 months? 0   Do you feel unsteady? 0   Are you worried about falling 0       Pt currently taking Anticoagulant therapy? ASA 81mg every day     Coordination of Care:  1. Have you been to the ER, urgent care clinic since your last visit? Hospitalized since your last visit? no    2. Have you seen or consulted any other health care providers outside of the [address] [place] [address] since your last visit? Include any pap smears or colon screening.  no

## 2021-11-01 NOTE — PROGRESS NOTES
Marguerite Singletary is in the office today for cardiovascular evaluation of his conduction system disease. He is a 66-year-old man with chronic left bundle branch block and some atypical l type chest discomforts over the years. The chest discomfort has been more suggestive of gastroesophageal reflux disease than angina according to Dr. Dupree Prior. .     He had a nuclear stress test in December 2013 which showed no evidence of prior infarction or ischemia and an overall ejection fraction of 48%. In the office today, he reports he is doing \"fine \". He is [place] all the time\". He has had no chest pain or shortness of breath. He has had no peripheral swelling. Encounter Diagnoses   Name Primary?  Atypical angina (HCC)     LBBB (left bundle branch block) Yes       Plan;: The patient seems to be asymptomatic in regard to his conduction system disease. His blood pressure in the office today is controlled. He continues on the metoprolol succinate and ramipril. We will plan to see him back in 1 year. PCP: Wolfgang Adler DO      Past Medical History:   Diagnosis Date    BPH (benign prostatic hyperplasia)     minimal    Cardiac nuclear imaging test 12/23/2013    No ischemia or prior infarction. EF 48% (possibly underestimated). Septal & apical dysk likely from LBBB. Nondiagnostic EKG on pharm stress test due to LBBB.  Family history of prostate cancer     LBBB (left bundle branch block)     Screening for prostate cancer        Past Surgical History:   Procedure Laterality Date    HX HERNIA REPAIR  5/1970    HX TONSILLECTOMY       Current Outpatient Medications   Medication Sig    metoprolol tartrate (LOPRESSOR) 50 mg tablet TAKE 1 TABLET BY MOUTH EVERY DAY WITH FOOD    ramipril (ALTACE) 10 mg capsule TK 1 C PO D FOR BP    calcium carbonate (TUMS) 200 mg calcium (500 mg) chew Take 1 tablet by mouth as needed.  aspirin delayed-release 81 mg tablet Take 81 mg by mouth daily.     omega-3 fatty acids-vitamin e (FISH OIL) 1,000 mg cap Take 1 Cap by mouth daily.  nitroglycerin (NITROSTAT) 0.4 mg SL tablet 1 Tab by SubLINGual route every five (5) minutes as needed for Chest Pain. No current facility-administered medications for this visit. No Known Allergies    Social History :  Social History     Tobacco Use    Smoking status: Former Smoker     Packs/day: 1.00     Years: 5.00     Pack years: 5.00     Quit date: 1973     Years since quittin.9    Smokeless tobacco: Never Used   Substance Use Topics    Alcohol use: Yes     Comment: socially        Family History: family history includes Cancer in an other family member; Heart defect in his father. Review of Systems:    Review of Systems   Constitutional: Negative. Respiratory: Negative. Cardiovascular: Negative. Gastrointestinal: Negative. Musculoskeletal: Negative. Neurological: Negative for dizziness. Physical Exam:    The patient is a cooperative and alert  Visit Vitals  /72 (BP 1 Location: Left upper arm, BP Patient Position: Sitting, BP Cuff Size: Adult)   Pulse (!) 59   Ht 5' 10\" (1.778 m)   Wt 72.1 kg (159 lb)   SpO2 97%   BMI 22.81 kg/m²       HEENT: Conjuctiva white and mucosa moist.  NECK: Supple without masses, tenderness or thyromegaly. There was no jugular venous distention. Carotids are without bruits. CHEST: Symmetrical with good excursion. LUNGS: Clear to auscultation in all fields. HEART:  There is a normal S1 and S2 without appreciable murmurs, rubs, clicks, or gallops   ABDOMEN: Soft without masses, tenderness or organomegaly. EXTREMITIES: Full peripheral pulses without peripheral edema.     Review of Data: See PMH and Cardiology and Imaging sections for cardiac testing    Lab Results   Component Value Date/Time    Cholesterol, total 135 2013 12:00 AM    HDL Cholesterol 40 2013 12:00 AM    LDL, calculated 75 2013 12:00 AM    VLDL, calculated 20 2013 12:00 AM Triglyceride 101 12/18/2013 12:00 AM       Results for orders placed or performed in visit on 11/09/20   AMB POC EKG ROUTINE W/ 12 LEADS, INTER & REP     Status: None    Narrative    Sinus bradycardia, rate 59  Complete left bundle branch block. LAD. No significant change compared to prior tracing        Sherlyn Mcgee MD, F.A.C.C. Cardiovascular Specialists  [facility] [place]  [address] [place] [address]. [address]    PLEASE NOTE:  This document has been produced using voice recognition software. Unrecognized errors in transcription may be present.

## 2022-10-25 ENCOUNTER — OFFICE VISIT (OUTPATIENT)
Dept: CARDIOLOGY CLINIC | Age: 77
End: 2022-10-25
Payer: MEDICARE

## 2022-10-25 VITALS
OXYGEN SATURATION: 98 % | SYSTOLIC BLOOD PRESSURE: 120 MMHG | WEIGHT: 153 LBS | HEIGHT: 70 IN | DIASTOLIC BLOOD PRESSURE: 80 MMHG | BODY MASS INDEX: 21.9 KG/M2 | HEART RATE: 62 BPM

## 2022-10-25 DIAGNOSIS — I20.8 ATYPICAL ANGINA (HCC): Primary | ICD-10-CM

## 2022-10-25 DIAGNOSIS — I44.7 LBBB (LEFT BUNDLE BRANCH BLOCK): ICD-10-CM

## 2022-10-25 DIAGNOSIS — I10 ESSENTIAL HYPERTENSION: ICD-10-CM

## 2022-10-25 PROCEDURE — 1123F ACP DISCUSS/DSCN MKR DOCD: CPT | Performed by: INTERNAL MEDICINE

## 2022-10-25 PROCEDURE — 93000 ELECTROCARDIOGRAM COMPLETE: CPT | Performed by: INTERNAL MEDICINE

## 2022-10-25 PROCEDURE — G8420 CALC BMI NORM PARAMETERS: HCPCS | Performed by: INTERNAL MEDICINE

## 2022-10-25 PROCEDURE — G8427 DOCREV CUR MEDS BY ELIG CLIN: HCPCS | Performed by: INTERNAL MEDICINE

## 2022-10-25 PROCEDURE — G8536 NO DOC ELDER MAL SCRN: HCPCS | Performed by: INTERNAL MEDICINE

## 2022-10-25 PROCEDURE — 3078F DIAST BP <80 MM HG: CPT | Performed by: INTERNAL MEDICINE

## 2022-10-25 PROCEDURE — 1101F PT FALLS ASSESS-DOCD LE1/YR: CPT | Performed by: INTERNAL MEDICINE

## 2022-10-25 PROCEDURE — 3074F SYST BP LT 130 MM HG: CPT | Performed by: INTERNAL MEDICINE

## 2022-10-25 PROCEDURE — G8432 DEP SCR NOT DOC, RNG: HCPCS | Performed by: INTERNAL MEDICINE

## 2022-10-25 PROCEDURE — 99214 OFFICE O/P EST MOD 30 MIN: CPT | Performed by: INTERNAL MEDICINE

## 2022-10-25 NOTE — PROGRESS NOTES
Estela Marshall III presents today for   Chief Complaint   Patient presents with    Follow-up     1 year        Estela Marshall III preferred language for health care discussion is english/other. Is someone accompanying this pt? no    Is the patient using any DME equipment during [address]? no    Depression Screening:  3 most recent PHQ Screens 10/26/2021   Little interest or pleasure in doing things Not at all   Feeling down, depressed, irritable, or hopeless Not at all   Total Score PHQ 2 0       Learning Assessment:  Learning Assessment 10/29/2018   PRIMARY LEARNER Patient   HIGHEST LEVEL OF EDUCATION - PRIMARY LEARNER  GRADUATED HIGH SCHOOL OR GED   BARRIERS PRIMARY LEARNER NONE   CO-LEARNER CAREGIVER No   PRIMARY LANGUAGE ENGLISH   LEARNER PREFERENCE PRIMARY READING     -     -   ANSWERED BY Patient   RELATIONSHIP SELF       Abuse Screening:  Abuse Screening Questionnaire 10/29/2018   Do you ever feel afraid of your partner? N   Are you in a relationship with someone who physically or mentally threatens you? N   Is it safe for you to go home? Y       Fall Risk  Fall Risk Assessment, last 12 mths 10/26/2021   Able to walk? Yes   Fall in past 12 months? 0   Do you feel unsteady? 0   Are you worried about falling 0       Pt currently taking Anticoagulant therapy? ASA 81mg     Coordination of Care:  1. Have you been to the ER, urgent care clinic since your last visit? Hospitalized since your last visit? no    2. Have you seen or consulted any other health care providers outside of the [address] [place] [address] since your last visit? Include any pap smears or colon screening.  no

## 2022-11-05 NOTE — PROGRESS NOTES
Andre Davis is in the office today for cardiovascular reevaluation of his conduction system disease. He is a 78-year-old man with chronic left bundle branch block and atypical l type chest pain. The chest discomfort has been more suggestive of gastroesophageal reflux disease than angina according to Dr. Humberto Corrales. .     He had a nuclear stress test in December 2013 which showed no evidence of prior infarction or ischemia and an overall ejection fraction of 48%. In the office today, he reports that he is doing well. He has had no shortness of breath or chest pain. He has had no palpitations, dizziness, near-syncope or syncope. He has had no peripheral swelling. He mows his grass without too much difficulty. Encounter Diagnoses   Name Primary? Atypical angina ([address] Yes    Essential hypertension      LBBB (left bundle branch block)        Plan;: The patient continues to be asymptomatic in regard to his conduction system disease. His blood pressure in the office today is  well-controlled. He continues on the metoprolol succinate and ramipril. We will plan to see him back in 1 year. PCP: Carlos A Levy DO      Past Medical History:   Diagnosis Date    BPH (benign prostatic hyperplasia)     minimal    Cardiac nuclear imaging test 12/23/2013    No ischemia or prior infarction. EF 48% (possibly underestimated). Septal & apical dysk likely from LBBB. Nondiagnostic EKG on pharm stress test due to LBBB. Family history of prostate cancer     LBBB (left bundle branch block)     Screening for prostate cancer        Past Surgical History:   Procedure Laterality Date    HX HERNIA REPAIR  5/1970    HX TONSILLECTOMY       Current Outpatient Medications   Medication Sig    metoprolol tartrate (LOPRESSOR) 50 mg tablet TAKE 1 TABLET BY MOUTH EVERY DAY WITH FOOD    ramipril (ALTACE) 10 mg capsule TK 1 C PO D FOR BP    calcium carbonate (TUMS) 200 mg calcium (500 mg) chew Take 1 tablet by mouth as needed.     aspirin delayed-release 81 mg tablet Take 81 mg by mouth daily. omega-3 fatty acids-vitamin e 1,000 mg cap Take 1 Cap by mouth daily. nitroglycerin (NITROSTAT) 0.4 mg SL tablet 1 Tab by SubLINGual route every five (5) minutes as needed for Chest Pain. No current facility-administered medications for this visit. No Known Allergies    Social History :  Social History     Tobacco Use    Smoking status: Former     Packs/day: 1.00     Years: 5.00     Pack years: 5.00     Types: Cigarettes     Quit date: 1973     Years since quittin.8    Smokeless tobacco: Never   Substance Use Topics    Alcohol use: Yes     Comment: socially        Family History: family history includes Cancer in an other family member; Heart defect in his father. Review of Systems:    Review of Systems   Constitutional: Negative. Respiratory: Negative. Cardiovascular: Negative. Gastrointestinal: Negative. Musculoskeletal: Negative. Neurological: Negative for dizziness. Physical Exam:    The patient is a cooperative and alert  Visit Vitals  /80 (BP 1 Location: Left upper arm, BP Patient Position: Sitting, BP Cuff Size: Adult)   Pulse 62   Ht 5' 10\" (1.778 m)   Wt 69.4 kg (153 lb)   SpO2 98%   BMI 21.95 kg/m²       HEENT: Conjuctiva white and mucosa moist.  NECK: Supple without masses, tenderness or thyromegaly. There was no jugular venous distention. Carotids are without bruits. CHEST: Symmetrical with good excursion. LUNGS: Clear to auscultation in all fields. HEART:  There is a normal S1 and S2 without appreciable murmurs, rubs, clicks, or gallops   ABDOMEN: Soft without masses, tenderness or organomegaly. EXTREMITIES: Full peripheral pulses without peripheral edema.     Review of Data: See PMH and Cardiology and Imaging sections for cardiac testing    Lab Results   Component Value Date/Time    Cholesterol, total 135 2013 12:00 AM    HDL Cholesterol 40 2013 12:00 AM    LDL, calculated 75 12/18/2013 12:00 AM    VLDL, calculated 20 12/18/2013 12:00 AM    Triglyceride 101 12/18/2013 12:00 AM       Results for orders placed or performed in visit on 10/25/2022   AMB POC EKG ROUTINE W/ 12 LEADS, INTER & REP     Status: None    Narrative    Sinus rhythm, rate 62. Left bundle branch block. LAD. No significant change compared to prior tracing        Angelica Rae MD  [facility]  Cardiovascular Specialists  [facility] [place] [facility]  [address] [facility]. [address] [address]    PLEASE NOTE:  This document has been produced using voice recognition software. Unrecognized errors in transcription may be present.

## 2023-10-09 ENCOUNTER — OFFICE VISIT (OUTPATIENT)
Age: 78
End: 2023-10-09

## 2023-10-09 VITALS — BODY MASS INDEX: 21.95 KG/M2 | HEIGHT: 70 IN | RESPIRATION RATE: 18 BRPM

## 2023-10-09 DIAGNOSIS — M72.2 PLANTAR FASCIA SYNDROME: Primary | ICD-10-CM

## 2023-10-09 DIAGNOSIS — M25.572 ACUTE LEFT ANKLE PAIN: ICD-10-CM

## 2023-10-09 NOTE — PATIENT INSTRUCTIONS
Please consider purchasing [employer]en[employer] Total Max Arch Support. This can be bought on [employer] or on [employer]. They range ~$40-$50 per pair.

## 2024-04-23 ENCOUNTER — OFFICE VISIT (OUTPATIENT)
Age: 79
End: 2024-04-23
Payer: MEDICARE

## 2024-04-23 VITALS — HEIGHT: 70 IN | BODY MASS INDEX: 22.2 KG/M2 | WEIGHT: 155.1 LBS

## 2024-04-23 DIAGNOSIS — M76.829 PTTD (POSTERIOR TIBIAL TENDON DYSFUNCTION): ICD-10-CM

## 2024-04-23 DIAGNOSIS — S90.31XA CONTUSION OF RIGHT FOOT, INITIAL ENCOUNTER: ICD-10-CM

## 2024-04-23 DIAGNOSIS — S97.81XA CRUSHING INJURY OF RIGHT FOOT AND ANKLE, INITIAL ENCOUNTER: Primary | ICD-10-CM

## 2024-04-23 DIAGNOSIS — S97.01XA CRUSHING INJURY OF RIGHT FOOT AND ANKLE, INITIAL ENCOUNTER: Primary | ICD-10-CM

## 2024-04-23 PROCEDURE — 1036F TOBACCO NON-USER: CPT | Performed by: ORTHOPAEDIC SURGERY

## 2024-04-23 PROCEDURE — 73630 X-RAY EXAM OF FOOT: CPT | Performed by: ORTHOPAEDIC SURGERY

## 2024-04-23 PROCEDURE — G8427 DOCREV CUR MEDS BY ELIG CLIN: HCPCS | Performed by: ORTHOPAEDIC SURGERY

## 2024-04-23 PROCEDURE — G8420 CALC BMI NORM PARAMETERS: HCPCS | Performed by: ORTHOPAEDIC SURGERY

## 2024-04-23 PROCEDURE — 73600 X-RAY EXAM OF ANKLE: CPT | Performed by: ORTHOPAEDIC SURGERY

## 2024-04-23 PROCEDURE — 1123F ACP DISCUSS/DSCN MKR DOCD: CPT | Performed by: ORTHOPAEDIC SURGERY

## 2024-04-23 PROCEDURE — 99204 OFFICE O/P NEW MOD 45 MIN: CPT | Performed by: ORTHOPAEDIC SURGERY

## 2024-04-23 RX ORDER — ACETAMINOPHEN 500 MG
500 TABLET ORAL 2 TIMES DAILY PRN
Qty: 60 TABLET | Refills: 0 | Status: SHIPPED | OUTPATIENT
Start: 2024-04-23

## 2024-04-23 NOTE — PROGRESS NOTES
MEDICATIONS:  A list of medications prior to the time of admission include:    Current Outpatient Medications   Medication Sig    acetaminophen (TYLENOL) 500 MG tablet Take 1 tablet by mouth 2 times daily as needed for Pain    Cholecalciferol (VITAMIN D3) 50 MCG (2000 UT) CAPS Take by mouth daily    Omega-3 Fatty Acids (FISH OIL) 1200 MG CAPS Take by mouth daily    Fidaxomicin (DIFICID) 200 MG TABS tablet Take 200 mg by mouth 2 times daily    metoprolol tartrate (LOPRESSOR) 50 MG tablet TAKE 1 TABLET BY MOUTH EVERY DAY WITH FOOD    ramipril (ALTACE) 10 MG capsule Take 1 capsule by mouth at bedtime     No current facility-administered medications for this visit.        DIAGNOSTIC LAB DATA      No results found for this or any previous visit from the past 3650 days.     No results found for this or any previous visit (from the past 4464 hour(s)).      No results found for: \"WBC\", \"HGB\", \"HCT\", \"MCV\", \"PLT\", \"LABLYMP\", \"MID\", \"GRAN\", \"LYMPHOPCT\", \"MIDPERCENT\", \"GRANULOCYTES\", \"RBC\", \"MCH\", \"MCHC\", \"RDW\"  No results found for: \"LABA1C\",  No results found for: \"NA\", \"K\", \"CL\", \"CO2\", \"BUN\", \"CREATININE\", \"GLUCOSE\", \"CALCIUM\", \"PROT\", \"LABALBU\", \"BILITOT\", \"ALKPHOS\", \"AST\", \"ALT\", \"LABGLOM\", \"GFRAA\", \"AGRATIO\", \"GLOB\"  No results found for: \"VITD25\" ,No results found for: \"INR\", \"PROTIME\", No results found for: \"APTT\"       REVIEW OF SYSTEMS : 4/23/2024  ALL BELOW ARE Negative except : SEE HPI     All other systems reviewed and are negative.     14 point review of systems otherwise negative unless noted in HPI.          From [facility] Steps Forward Documentation   re Medical Decision Making  MDM // AMA 2023  From  [facility] (E/M) Changes: What You need to know for 2021    E/M Coding: Each element (number of diagnoses, complexity of data, and risk (can be classified as straightforward, low, moderate, or high.      For CPT coding, 2 of 3 MDM elements need to meet the Moderate level to be considered level for MDM.  Level 4  (2/3 is